# Patient Record
Sex: MALE | Race: OTHER | HISPANIC OR LATINO | URBAN - METROPOLITAN AREA
[De-identification: names, ages, dates, MRNs, and addresses within clinical notes are randomized per-mention and may not be internally consistent; named-entity substitution may affect disease eponyms.]

---

## 2023-09-07 ENCOUNTER — EMERGENCY (EMERGENCY)
Facility: HOSPITAL | Age: 58
LOS: 1 days | Discharge: HOME | End: 2023-09-07
Attending: EMERGENCY MEDICINE | Admitting: EMERGENCY MEDICINE
Payer: OTHER MISCELLANEOUS

## 2023-09-07 VITALS
RESPIRATION RATE: 16 BRPM | SYSTOLIC BLOOD PRESSURE: 149 MMHG | DIASTOLIC BLOOD PRESSURE: 99 MMHG | HEART RATE: 84 BPM | OXYGEN SATURATION: 98 % | TEMPERATURE: 98 F

## 2023-09-07 PROCEDURE — 73080 X-RAY EXAM OF ELBOW: CPT | Mod: 26,RT

## 2023-09-07 PROCEDURE — 93010 ELECTROCARDIOGRAM REPORT: CPT

## 2023-09-07 PROCEDURE — 99283 EMERGENCY DEPT VISIT LOW MDM: CPT

## 2023-09-07 RX ORDER — ACETAMINOPHEN 500 MG
650 TABLET ORAL ONCE
Refills: 0 | Status: COMPLETED | OUTPATIENT
Start: 2023-09-07 | End: 2023-09-07

## 2023-09-07 RX ADMIN — Medication 650 MILLIGRAM(S): at 10:56

## 2023-09-07 NOTE — ED ADULT NURSE NOTE - OBJECTIVE STATEMENT
Felt right elbow pop when lifting a garbage cup 2 days ago. Place a brace on arm and took Bufferin with some relief. Slight weakness noted to right extremeity

## 2023-09-07 NOTE — ED PROVIDER NOTE - NSFOLLOWUPINSTRUCTIONS_ED_ALL_ED_FT
Take motrin 200 mg every 6-8 hours as needed for pain and/or tylenol 325 mg every 4 hours as needed for pain.  If worse pain, swelling, color change, deformity, or any worse symptoms return to the ER.  Follow up with your doctor within the next few days and orthopedics.  NO heavy lifting or activity with right elbow

## 2023-09-07 NOTE — ED PROVIDER NOTE - OBJECTIVE STATEMENT
Pt c/o right elbow pain- s/p was lifting something - garbage, felt like something might have popped out and popped back in.  Pt had prior surgery to right elbow so wants to get checked out.  No direct trauma or injury elsewhere

## 2023-09-07 NOTE — ED ADULT TRIAGE NOTE - CHIEF COMPLAINT QUOTE
Pt. c/o right lower arm pain after lifting a trash can up 2 days ago. States he heard a popping noise at the time. Applied brace and taking Motrin for pain.

## 2024-02-02 NOTE — ED PROVIDER NOTE - PATIENT PORTAL LINK FT
[Time Spent: ___ minutes] : I have spent [unfilled] minutes of time on the encounter. You can access the FollowMyHealth Patient Portal offered by [facility] by registering at the following website: [email or web address]. By joining [facility]’s FollowMyHealth portal, you will also be able to view your health information using other applications (apps) compatible with our system.